# Patient Record
Sex: FEMALE | Race: WHITE | NOT HISPANIC OR LATINO | Employment: OTHER | URBAN - METROPOLITAN AREA
[De-identification: names, ages, dates, MRNs, and addresses within clinical notes are randomized per-mention and may not be internally consistent; named-entity substitution may affect disease eponyms.]

---

## 2020-05-06 NOTE — PROGRESS NOTES
Patient ID: Angie Finn is a 39 y.o. female.    Chief Complaint: family history of breast cancer      HPI:patient presents for evaluation of need for genetic testing due to family history of breast cancer in her mother and because pt is scheduled to have a hyst in the near future and trying to decide if ovary removal is recommended at that time based on genetic risk.  [facility]    Risk factors identified:    Mammogram 2018- mammogram - due to abnormal clinical exam- negative imaging.      Menarche at 15 y/o  G 2 P 1 misc-1  First pregnancy at 21 y/o  LMP: 4/15/2020  Estrogen: none  Radiation to the neck or chest wall- none  Prior breast biopsies or atypical hyperplasia- none      ETOH: occasionally     FH: mother breast cancer at 56 y/o, lung and throat cancer at 66 y/o, maternal grandfather bladder cancer at 79 y/o    Body mass index is 35.87 kg/m².    Review of Systems   Constitutional: Negative.    HENT: Negative.    Eyes: Negative.    Respiratory: Negative.    Cardiovascular: Negative.    Gastrointestinal: Negative.         No reflux   Endocrine: Negative.    Genitourinary: Negative.    Musculoskeletal: Negative.    Skin: Negative.    Allergic/Immunologic: Negative.    Neurological: Negative.    Hematological: Negative.  Negative for adenopathy.   Psychiatric/Behavioral: Negative.      Breast: Pt denies any breast pain, nipple discharge, or palpable mass. No prior trauma or bruising. No breast surgeries or abnormalities.    Current Outpatient Medications   Medication Sig Dispense Refill    atorvastatin (LIPITOR) 40 MG tablet Take 40 mg by mouth once daily.      ferrous gluconate (FERGON) 324 MG tablet Take 324 mg by mouth daily with breakfast.      FLUoxetine 20 MG capsule Take 20 mg by mouth once daily.      gabapentin (NEURONTIN) 300 MG capsule Take 300 mg by mouth 2 (two) times daily.      hydroCHLOROthiazide (HYDRODIURIL) 25 MG tablet Take 25 mg by  mouth once daily.      losartan (COZAAR) 25 MG tablet Take 25 mg by mouth once daily.      metFORMIN (GLUCOPHAGE) 500 MG tablet Take 500 mg by mouth once daily.      mv-mn/iron/folic acid/herb 190 (VITAMIN D3 COMPLETE ORAL) Take by mouth once daily.      omeprazole (PRILOSEC) 20 MG capsule Take 20 mg by mouth 2 (two) times daily.      potassium bicarb-citric acid 20 mEq TbEF Take 20 mEq by mouth 2 (two) times a day.       No current facility-administered medications for this visit.        Review of patient's allergies indicates:   Allergen Reactions    Paxil [paroxetine hcl] Shortness Of Breath       No past medical history on file.    No past surgical history on file.    No family history on file.    Social History     Socioeconomic History    Marital status: Single     Spouse name: Not on file    Number of children: Not on file    Years of education: Not on file    Highest education level: Not on file   Occupational History    Not on file   Social Needs    Financial resource strain: Not on file    Food insecurity:     Worry: Not on file     Inability: Not on file    Transportation needs:     Medical: Not on file     Non-medical: Not on file   Tobacco Use    Smoking status: Current Every Day Smoker     Packs/day: 1.00   Substance and Sexual Activity    Alcohol use: Yes     Frequency: Monthly or less    Drug use: Not on file    Sexual activity: Not on file   Lifestyle    Physical activity:     Days per week: Not on file     Minutes per session: Not on file    Stress: Not on file   Relationships    Social connections:     Talks on phone: Not on file     Gets together: Not on file     Attends Sikhism service: Not on file     Active member of club or organization: Not on file     Attends meetings of clubs or organizations: Not on file     Relationship status: Not on file   Other Topics Concern    Not on file   Social History Narrative    Not on file       Vitals:    05/12/20 0717   BP: 131/87    Pulse: 93   Resp: 18   Temp: 98.3 °F (36.8 °C)       Physical Exam   Constitutional: She is oriented to person, place, and time. She appears well-developed and well-nourished.   HENT:   Head: Normocephalic and atraumatic.   Right Ear: External ear normal.   Left Ear: External ear normal.   Mouth/Throat: No oropharyngeal exudate.   Eyes: Pupils are equal, round, and reactive to light. Conjunctivae and EOM are normal. Right eye exhibits no discharge. Left eye exhibits no discharge. No scleral icterus.   Neck: Normal range of motion. Neck supple. No thyromegaly present.   Cardiovascular: Normal rate, regular rhythm and normal heart sounds.   Pulmonary/Chest: Effort normal and breath sounds normal. Right breast exhibits no inverted nipple, no mass, no nipple discharge, no skin change and no tenderness. Left breast exhibits no inverted nipple, no mass, no nipple discharge, no skin change and no tenderness.   Abdominal: Soft. Bowel sounds are normal.   Musculoskeletal: Normal range of motion. She exhibits no edema.        Right shoulder: She exhibits no crepitus and normal strength.   Lymphadenopathy:        Head (right side): No submental, no submandibular, no tonsillar, no preauricular, no posterior auricular and no occipital adenopathy present.        Head (left side): No submental, no submandibular, no tonsillar, no preauricular, no posterior auricular and no occipital adenopathy present.     She has no cervical adenopathy.        Right cervical: No superficial cervical and no posterior cervical adenopathy present.       Left cervical: No superficial cervical and no posterior cervical adenopathy present.     She has no axillary adenopathy.        Right: No supraclavicular adenopathy present.        Left: No supraclavicular adenopathy present.   Neurological: She is alert and oriented to person, place, and time. She has normal reflexes.   Skin: Skin is warm and dry. No rash noted. No erythema. No pallor.    Psychiatric: She has a normal mood and affect. Her behavior is normal. Judgment and thought content normal.       No image results found.     [facility]amm[facility] at [facility]- negative per pt report 2018     Assessment & Plan:  Family history of breast cancer    Counseling and coordination of care    Genetic testing    Counseling on health promotion and disease prevention    Health education/counseling    DUB (dysfunctional uterine bleeding)      1. Negative clinical findings on exam.  2. Negative imaging- per pt report- family history of breast cancer in mother- pt getting ready to have hyst and wanted to have genetic testing to see if removal of ovaries was recommended based on risk  3. Explained risk vs benefits of testing.   4. Next imaging due:unless otherwise indicated recommend mammogram at 43 y/o  5. Discussed modifiable risk factors such as diet and exercise. Reviewed diet and counseled pt regarding eating more fruits and vegetables throughout the day.  6. Diet modifications:calorie reduction  7. Exercise:none at this time-   8. Encouraged 30 minutes most days of the week. Explained benefits of losing a few pounds to decrease estrogen exposure from fat cells.  9. Discussed risks vs benefits of genetic testing.  Indications for testing: due to her family history of breast cancer. Explained process of drawing blood- filing with insurance- if denied pt will be notified and given the option of paying out of pocket.   10. Explained 3 possible answers to genetic testing.   - Negative - if testing is negative would proceed with MRI and Mammogram screenings alternating every 6 months with clinical exams.  - Positive - If positive for a mutation- depending on the gene identified - would likely be recommended to see surgeon and plastic surgeon to discuss risk reducing prophylactic manuel mastectomies with reconstruction with or without TAHBSO depending on test results.  Indication for testing of other relatives  explained.  - Variant of undetermined significance - usually does not change follow-up recommendations or screenings unless reclassified in the future as a definitive mutation.   Consent for testing signed after risks vs benefits explained. [facility]- forms completed. Will call pt when receive information from [facility] regarding approval for testing.   11. BSE technique was demonstrated and explained. Related what to look and feel for on exam monthly. Pt verbalized understanding and return demonstrated proper technique and knowledge of what to look for on self-exam. Pt instructed to call if notes any changes. Contact information given.   One hour- 60 minutes was spent with this patient and 75% was spent identifying risk factors, reviewing records and educating pt regarding risk reduction strategies and planning future screenings.            Information about strategies to decrease breast cancer risk factors from Up to Date given to the pt.

## 2020-05-12 ENCOUNTER — OFFICE VISIT (OUTPATIENT)
Dept: HEMATOLOGY/ONCOLOGY | Facility: CLINIC | Age: 40
End: 2020-05-12
Payer: COMMERCIAL

## 2020-05-12 VITALS
BODY MASS INDEX: 35.84 KG/M2 | RESPIRATION RATE: 18 BRPM | HEIGHT: 61 IN | SYSTOLIC BLOOD PRESSURE: 131 MMHG | TEMPERATURE: 98 F | OXYGEN SATURATION: 98 % | HEART RATE: 93 BPM | WEIGHT: 189.81 LBS | DIASTOLIC BLOOD PRESSURE: 87 MMHG

## 2020-05-12 DIAGNOSIS — Z71.89 COUNSELING ON HEALTH PROMOTION AND DISEASE PREVENTION: ICD-10-CM

## 2020-05-12 DIAGNOSIS — Z13.79 GENETIC TESTING: ICD-10-CM

## 2020-05-12 DIAGNOSIS — Z80.3 FAMILY HISTORY OF BREAST CANCER: Primary | ICD-10-CM

## 2020-05-12 DIAGNOSIS — Z71.9 HEALTH EDUCATION/COUNSELING: ICD-10-CM

## 2020-05-12 DIAGNOSIS — N93.8 DUB (DYSFUNCTIONAL UTERINE BLEEDING): ICD-10-CM

## 2020-05-12 DIAGNOSIS — Z71.89 COUNSELING AND COORDINATION OF CARE: ICD-10-CM

## 2020-05-12 PROCEDURE — 99205 OFFICE O/P NEW HI 60 MIN: CPT | Mod: S$PBB,,, | Performed by: NURSE PRACTITIONER

## 2020-05-12 PROCEDURE — 99204 OFFICE O/P NEW MOD 45 MIN: CPT | Mod: PBBFAC | Performed by: NURSE PRACTITIONER

## 2020-05-12 PROCEDURE — 99999 PR PBB SHADOW E&M-NEW PATIENT-LVL IV: CPT | Mod: PBBFAC,,, | Performed by: NURSE PRACTITIONER

## 2020-05-12 PROCEDURE — 99205 PR OFFICE/OUTPT VISIT, NEW, LEVL V, 60-74 MIN: ICD-10-PCS | Mod: S$PBB,,, | Performed by: NURSE PRACTITIONER

## 2020-05-12 PROCEDURE — 99999 PR PBB SHADOW E&M-NEW PATIENT-LVL IV: ICD-10-PCS | Mod: PBBFAC,,, | Performed by: NURSE PRACTITIONER

## 2020-05-12 RX ORDER — FERROUS GLUCONATE 324(38)MG
324 TABLET ORAL
COMMUNITY

## 2020-05-12 RX ORDER — OMEPRAZOLE 20 MG/1
20 CAPSULE, DELAYED RELEASE ORAL 2 TIMES DAILY
COMMUNITY

## 2020-05-12 RX ORDER — LOSARTAN POTASSIUM 25 MG/1
25 TABLET ORAL DAILY
COMMUNITY

## 2020-05-12 RX ORDER — HYDROCHLOROTHIAZIDE 25 MG/1
25 TABLET ORAL DAILY
COMMUNITY

## 2020-05-12 RX ORDER — GABAPENTIN 300 MG/1
300 CAPSULE ORAL 2 TIMES DAILY
COMMUNITY

## 2020-05-12 RX ORDER — METFORMIN HYDROCHLORIDE 500 MG/1
500 TABLET ORAL DAILY
COMMUNITY

## 2020-05-12 RX ORDER — ATORVASTATIN CALCIUM 40 MG/1
40 TABLET, FILM COATED ORAL DAILY
COMMUNITY

## 2020-05-12 RX ORDER — FLUOXETINE HYDROCHLORIDE 20 MG/1
20 CAPSULE ORAL DAILY
COMMUNITY

## 2020-05-12 NOTE — LETTER
May 12, 2020      [facility]  [address] [address]           [facility]  [address] [address] [address]  Phone: [phone]  Fax: [phone]          Patient: Angie Finn   MR Number: [id number]   YOB: 1980   Date of Visit: 5/12/2020       Dear [facility]:    Thank you for referring Angie Finn to me for evaluation. Attached you will find relevant portions of my assessment and plan of care.    If you have questions, please do not hesitate to call me. I look forward to following Angie Finn along with you.    Sincerely,    Grisel Watts NP    Enclosure  CC:  No Recipients    If you would like to receive this communication electronically, please contact [email or web address] or [phone] to request more information on [facility] access.    For providers and/or their staff who would like to refer a patient to [facility], please contact us through our one-stop-shop provider referral line, Irving Farfan, at [phone].    If you feel you have received this communication in error or would no longer like to receive these types of communications, please e-mail [email or web address]

## 2020-05-19 ENCOUNTER — TELEPHONE (OUTPATIENT)
Dept: HEMATOLOGY/ONCOLOGY | Facility: CLINIC | Age: 40
End: 2020-05-19

## 2020-05-19 NOTE — TELEPHONE ENCOUNTER
----- Message from Angela Ghotra LPN sent at 5/12/2020  4:53 PM CDT -----  They were already gone for the day when I called  ----- Message -----  From: Grisel Watts NP  Sent: 5/12/2020   4:08 PM CDT  To: Angela Ghotra LPN    Angela- please use the [facility] auth number on the paperwork - I can show you where to put this number and we can get the pt in for the blood draw.    Grisel  ----- Message -----  From: Angela Ghotra LPN  Sent: 5/12/2020   3:57 PM CDT  To: Grisel Watts NP    Please advise  ----- Message -----  From: Reginald Godoy  Sent: 5/12/2020   3:52 PM CDT  To: [facility] Staff    Angie Aakash had an appt today for genetic testing/counseling, there was a confusion with the authorization if the genetic testing will be authorized. I scanned the authorization into  for genetic testing, yes it is [facility] per [facility]. If you have any questions please reach out to me Reginald ext [phone]. Thanks

## 2020-05-28 ENCOUNTER — TELEPHONE (OUTPATIENT)
Dept: HEMATOLOGY/ONCOLOGY | Facility: CLINIC | Age: 40
End: 2020-05-28

## 2020-05-28 NOTE — TELEPHONE ENCOUNTER
----- Message from Meghana Clark sent at 5/28/2020 10:02 AM CDT -----  Contact: [facility]  Request a call from Angela concerning a authorization referral from the [facility], can be reached at [phone] ext. [phone] fax# [phone]///[email or web address]

## 2020-07-01 ENCOUNTER — DOCUMENTATION ONLY (OUTPATIENT)
Dept: HEMATOLOGY/ONCOLOGY | Facility: CLINIC | Age: 40
End: 2020-07-01

## 2020-07-01 NOTE — PROGRESS NOTES
Genetic testing results are negative. Results scanned into chart- pt notified via pt portal and will be given packet sent by [employer] when we receive.

## 2021-06-04 ENCOUNTER — TELEPHONE (OUTPATIENT)
Dept: ENDOSCOPY | Facility: HOSPITAL | Age: 41
End: 2021-06-04

## 2021-06-15 DIAGNOSIS — Z01.89 ENCOUNTER FOR OTHER SPECIFIED SPECIAL EXAMINATIONS: Primary | ICD-10-CM

## 2021-07-01 ENCOUNTER — TELEPHONE (OUTPATIENT)
Dept: ENDOSCOPY | Facility: HOSPITAL | Age: 41
End: 2021-07-01

## 2021-07-07 ENCOUNTER — TELEPHONE (OUTPATIENT)
Dept: ENDOSCOPY | Facility: HOSPITAL | Age: 41
End: 2021-07-07

## 2021-07-08 ENCOUNTER — TELEPHONE (OUTPATIENT)
Dept: ENDOSCOPY | Facility: HOSPITAL | Age: 41
End: 2021-07-08

## 2021-07-15 ENCOUNTER — DOCUMENTATION ONLY (OUTPATIENT)
Dept: ENDOSCOPY | Facility: HOSPITAL | Age: 41
End: 2021-07-15

## 2021-07-19 ENCOUNTER — TELEPHONE (OUTPATIENT)
Dept: ENDOSCOPY | Facility: HOSPITAL | Age: 41
End: 2021-07-19